# Patient Record
Sex: FEMALE | Employment: UNEMPLOYED | URBAN - METROPOLITAN AREA
[De-identification: names, ages, dates, MRNs, and addresses within clinical notes are randomized per-mention and may not be internally consistent; named-entity substitution may affect disease eponyms.]

---

## 2018-07-11 ENCOUNTER — OFFICE VISIT (OUTPATIENT)
Dept: SURGERY | Age: 58
End: 2018-07-11

## 2018-07-11 VITALS
DIASTOLIC BLOOD PRESSURE: 54 MMHG | HEIGHT: 68 IN | HEART RATE: 95 BPM | WEIGHT: 227 LBS | BODY MASS INDEX: 34.4 KG/M2 | SYSTOLIC BLOOD PRESSURE: 123 MMHG

## 2018-07-11 DIAGNOSIS — N63.20 BREAST MASS, LEFT: Primary | ICD-10-CM

## 2018-07-11 RX ORDER — FLUCONAZOLE 150 MG/1
TABLET ORAL
COMMUNITY
Start: 2018-06-06 | End: 2018-07-11 | Stop reason: ALTCHOICE

## 2018-07-11 RX ORDER — ZOSTER VACCINE RECOMBINANT, ADJUVANTED 50 MCG/0.5
KIT INTRAMUSCULAR
COMMUNITY
Start: 2018-05-09

## 2018-07-11 RX ORDER — METRONIDAZOLE 500 MG/1
TABLET ORAL
COMMUNITY
Start: 2018-06-06 | End: 2018-07-11

## 2018-07-11 NOTE — LETTER
7/11/2018 5:13 PM 
 
Patient:  Ana Lipscomb YOB: 1960 Date of Visit: 7/11/2018 Dear Dr. Joe Cespedes: Thank you for referring Ms. Brenda Waddell to me for evaluation/treatment. Below are the relevant portions of my assessment and plan of care. HISTORY OF PRESENT ILLNESS Ana Lipscomb is a 63 y.o. female. HPI 
NEW patient referral for consultation by Dr. Ashok Walker FW for a palpable LEFT breast lump. The patient cannot palpate the lump but her gyn can palpate it. The lump has been there since last September and at that time had breast imaging that was benign. At her annual gyn checkup this year the lump is still palpable. The patient denies any nipple inversion or discharge. OB History Obstetric Comments  
  Menarche 16, LMP age 51, # of children 1, age of 4st delivery 29, Hysterectomy/oophorectomy no/no, Breast bx no history of breast feeding no BCP yes, Hormone therapy no  
  
  
No family history of breast or ovarian cancer. Bilateral diagnostic mammogram 9/2017 - BI RADS 1 Past Medical History:  
Diagnosis Date  Hypercholesterolemia  Hypertension Past Surgical History:  
Procedure Laterality Date  HX GYN    
 3 c/sections  LAP, PLACE ADJUST GASTR BAND Social History Social History  Marital status:  Spouse name: N/A  
 Number of children: N/A  
 Years of education: N/A Occupational History  Not on file. Social History Main Topics  Smoking status: Never Smoker  Smokeless tobacco: Never Used  Alcohol use No  
 Drug use: No  
 Sexual activity: Not on file Other Topics Concern  Not on file Social History Narrative Current Outpatient Prescriptions on File Prior to Visit Medication Sig Dispense Refill  losartan (COZAAR) 100 mg tablet Take 100 mg by mouth daily. No current facility-administered medications on file prior to visit. Allergies Allergen Reactions  Sudafed [Pseudoephedrine Hcl] Unknown (comments) OB History Obstetric Comments Menarche 16, LMP age 51, # of children 1, age of 4st delivery 29, Hysterectomy/oophorectomy no/no, Breast bx no history of breast feeding no BCP yes, Hormone therapy no ROS Constitutional: Negative. HENT: Negative. Eyes: Negative. Respiratory: Negative. Cardiovascular: Negative. Gastrointestinal: Negative. Genitourinary: Negative. Musculoskeletal: Negative. Skin: Negative. Neurological: Negative. Endo/Heme/Allergies: Negative. Psychiatric/Behavioral: Negative. Physical Exam  
Cardiovascular: Normal rate, regular rhythm and normal heart sounds. Pulmonary/Chest: Breath sounds normal. Right breast exhibits no inverted nipple, no mass, no nipple discharge, no skin change and no tenderness. Left breast exhibits mass. Left breast exhibits no inverted nipple, no nipple discharge, no skin change and no tenderness. Breasts are asymmetrical (L>R). Lymphadenopathy:  
     Right cervical: No superficial cervical, no deep cervical and no posterior cervical adenopathy present. Left cervical: No superficial cervical, no deep cervical and no posterior cervical adenopathy present. She has no axillary adenopathy. Right axillary: No pectoral and no lateral adenopathy present. Left axillary: No pectoral and no lateral adenopathy present. BREAST ULTRASOUND Indication: Left  breast mass UOQ Technique: The area was scanned using a high-frequency linear-array near-field transducer Findings: Grouping of prominent, normal lobules Impression: Normal breast tissue Disposition: No worrisome finding on ultrasound ASSESSMENT and PLAN 
  ICD-10-CM ICD-9-CM 1. Breast mass, left N63.20 611.72 New pt presents for evaluation of tender LEFT breast mass.  She states that her OB-GYN felt mass last November, mammo was negative, and pt has not had MRI due to insurance. Pt also notes asymmetry, L>R. Mass palpable on exam at LEFT breast UOQ. US visualizes grouping of prominent, normal lobules. I wouldn't classify this as a lipoma, and do not believe it to be worrisome. Also discussed asymmetry. Pt notes that she lost weight several years ago with lap band, but has gained some of this weight back. Discussed plastic surgery options including mastopexy. Assured pt that asymmetry itself will not cause any long term harm or increased risk to her health. If this continues to be bothersome to pt (soreness due to trouble finding a supportive bra, trouble fitting in her clothes), I can refer her to a plastic surgeon for further consultation. F/U PRN. This plan was reviewed with the patient and patient agrees. All questions were answered. Written by Derek Payne, as dictated by Dr. Keyon Galeas MD. 
 
 
If you have questions, please do not hesitate to call me. I look forward to following MsGeovany Dahlia Mendez along with you.  
 
 
 
Sincerely, 
 
 
Feroz Koch MD

## 2018-07-11 NOTE — COMMUNICATION BODY
HISTORY OF PRESENT ILLNESS  Tim Saldivar is a 63 y.o. female. HPI  NEW patient referral for consultation by Dr. Kevon Hoffman FW for a palpable LEFT breast lump. The patient cannot palpate the lump but her gyn can palpate it. The lump has been there since last September and at that time had breast imaging that was benign. At her annual gyn checkup this year the lump is still palpable. The patient denies any nipple inversion or discharge. OB History     Obstetric Comments     Menarche 16, LMP age 51, # of children 1, age of 4st delivery 29, Hysterectomy/oophorectomy no/no, Breast bx no history of breast feeding no BCP yes, Hormone therapy no         No family history of breast or ovarian cancer. Bilateral diagnostic mammogram 9/2017 - BI RADS 1    Past Medical History:   Diagnosis Date    Hypercholesterolemia     Hypertension        Past Surgical History:   Procedure Laterality Date    HX GYN      3 c/sections    LAP, PLACE ADJUST GASTR BAND         Social History     Social History    Marital status:      Spouse name: N/A    Number of children: N/A    Years of education: N/A     Occupational History    Not on file. Social History Main Topics    Smoking status: Never Smoker    Smokeless tobacco: Never Used    Alcohol use No    Drug use: No    Sexual activity: Not on file     Other Topics Concern    Not on file     Social History Narrative       Current Outpatient Prescriptions on File Prior to Visit   Medication Sig Dispense Refill    losartan (COZAAR) 100 mg tablet Take 100 mg by mouth daily. No current facility-administered medications on file prior to visit.         Allergies   Allergen Reactions    Sudafed [Pseudoephedrine Hcl] Unknown (comments)       OB History     Obstetric Comments    Menarche 16, LMP age 51, # of children 1, age of 4st delivery 29, Hysterectomy/oophorectomy no/no, Breast bx no history of breast feeding no BCP yes, Hormone therapy no ROS  Constitutional: Negative. HENT: Negative. Eyes: Negative. Respiratory: Negative. Cardiovascular: Negative. Gastrointestinal: Negative. Genitourinary: Negative. Musculoskeletal: Negative. Skin: Negative. Neurological: Negative. Endo/Heme/Allergies: Negative. Psychiatric/Behavioral: Negative. Physical Exam   Cardiovascular: Normal rate, regular rhythm and normal heart sounds. Pulmonary/Chest: Breath sounds normal. Right breast exhibits no inverted nipple, no mass, no nipple discharge, no skin change and no tenderness. Left breast exhibits mass. Left breast exhibits no inverted nipple, no nipple discharge, no skin change and no tenderness. Breasts are asymmetrical (L>R). Lymphadenopathy:        Right cervical: No superficial cervical, no deep cervical and no posterior cervical adenopathy present. Left cervical: No superficial cervical, no deep cervical and no posterior cervical adenopathy present. She has no axillary adenopathy. Right axillary: No pectoral and no lateral adenopathy present. Left axillary: No pectoral and no lateral adenopathy present. BREAST ULTRASOUND  Indication: Left  breast mass UOQ  Technique: The area was scanned using a high-frequency linear-array near-field transducer  Findings: Grouping of prominent, normal lobules  Impression: Normal breast tissue   Disposition: No worrisome finding on ultrasound    ASSESSMENT and PLAN    ICD-10-CM ICD-9-CM    1. Breast mass, left N63.20 611.72       New pt presents for evaluation of tender LEFT breast mass. She states that her OB-GYN felt mass last November, mammo was negative, and pt has not had MRI due to insurance. Pt also notes asymmetry, L>R. Mass palpable on exam at LEFT breast UOQ. US visualizes grouping of prominent, normal lobules. I wouldn't classify this as a lipoma, and do not believe it to be worrisome. Also discussed asymmetry.  Pt notes that she lost weight several years ago with lap band, but has gained some of this weight back. Discussed plastic surgery options including mastopexy. Assured pt that asymmetry itself will not cause any long term harm or increased risk to her health. If this continues to be bothersome to pt (soreness due to trouble finding a supportive bra, trouble fitting in her clothes), I can refer her to a plastic surgeon for further consultation. F/U PRN. This plan was reviewed with the patient and patient agrees. All questions were answered.     Written by Fernandez Aquino, as dictated by Dr. Jovanna Lopez MD.

## 2018-07-11 NOTE — PATIENT INSTRUCTIONS
Breast Lumps: Care Instructions  Your Care Instructions  Breast lumps are common, especially in women between ages 27 and 48. Many women's breasts feel lumpy and tender before their menstrual period. Women also may have lumps when they are breastfeeding. Breast lumps may go away after menopause. All new breast lumps in women after menopause should be checked by a doctor. Although lumps may be normal for you, it is important to have your doctor check any lump or thickness that is not like the rest of your breast to make sure it is not cancer. A lump may be larger, harder, or different from the rest of your breast tissue. Follow-up care is a key part of your treatment and safety. Be sure to make and go to all appointments, and call your doctor if you are having problems. It's also a good idea to know your test results and keep a list of the medicines you take. How can you care for yourself at home? · Make an appointment to have a mammogram and other follow-up visits as recommended by your doctor. When should you call for help? Watch closely for changes in your health, and be sure to contact your doctor if:    · You do not get better as expected.     · Your breast has changed.     · You have pain in your breast.     · You have a discharge from your nipple.     · A breast lump changes or does not go away. Where can you learn more? Go to http://myra-charmaine.info/. Enter B729 in the search box to learn more about \"Breast Lumps: Care Instructions. \"  Current as of: October 6, 2017  Content Version: 11.7  © 7525-6202 [employer]. Care instructions adapted under license by [employer] (which disclaims liability or warranty for this information). If you have questions about a medical condition or this instruction, always ask your healthcare professional. [address] any warranty or liability for your use of this information.

## 2018-07-11 NOTE — PROGRESS NOTES
HISTORY OF PRESENT ILLNESS  Floyd Poe is a 63 y.o. female. HPI NEW patient referral for consultation by Dr. Carrington Pérez [facility] for a palpable LEFT breast lump. The patient cannot palpate the lump but her gyn can palpate it. The lump has been there since last September and at that time had breast imaging that was benign. At her annual gyn checkup this year the lump is still palpable. The patient denies any nipple inversion or discharge. OB History     Obstetric Comments    Menarche 16, LMP age 51, # of children 1, age of 4st delivery 29, Hysterectomy/oophorectomy no/no, Breast bx no history of breast feeding no BCP yes, Hormone therapy no        No family history of breast or ovarian cancer. Bilateral diagnostic mammogram 9/2017 - BI RADS 1    Review of Systems   Constitutional: Negative. HENT: Negative. Eyes: Negative. Respiratory: Negative. Cardiovascular: Negative. Gastrointestinal: Negative. Genitourinary: Negative. Musculoskeletal: Negative. Skin: Negative. Neurological: Negative. Endo/Heme/Allergies: Negative. Psychiatric/Behavioral: Negative.         Physical Exam    ASSESSMENT and PLAN  {ASSESSMENT/PLAN:57251}

## 2018-07-11 NOTE — PROGRESS NOTES
HISTORY OF PRESENT ILLNESS  Charmaine Pack is a 63 y.o. female. HPI  NEW patient referral for consultation by Dr. Delmy PATIÑO for a palpable LEFT breast lump. The patient cannot palpate the lump but her gyn can palpate it. The lump has been there since last September and at that time had breast imaging that was benign. At her annual gyn checkup this year the lump is still palpable. The patient denies any nipple inversion or discharge. OB History     Obstetric Comments     Menarche 16, LMP age 51, # of children 1, age of 4st delivery 29, Hysterectomy/oophorectomy no/no, Breast bx no history of breast feeding no BCP yes, Hormone therapy no         No family history of breast or ovarian cancer. Bilateral diagnostic mammogram 9/2017 - BI RADS 1    Past Medical History:   Diagnosis Date    Hypercholesterolemia     Hypertension        Past Surgical History:   Procedure Laterality Date    HX GYN      3 c/sections    LAP, PLACE ADJUST GASTR BAND         Social History     Social History    Marital status:      Spouse name: N/A    Number of children: N/A    Years of education: N/A     Occupational History    Not on file. Social History Main Topics    Smoking status: Never Smoker    Smokeless tobacco: Never Used    Alcohol use No    Drug use: No    Sexual activity: Not on file     Other Topics Concern    Not on file     Social History Narrative       Current Outpatient Prescriptions on File Prior to Visit   Medication Sig Dispense Refill    losartan (COZAAR) 100 mg tablet Take 100 mg by mouth daily. No current facility-administered medications on file prior to visit.         Allergies   Allergen Reactions    Sudafed [Pseudoephedrine Hcl] Unknown (comments)       OB History     Obstetric Comments    Menarche 16, LMP age 51, # of children 1, age of 4st delivery 29, Hysterectomy/oophorectomy no/no, Breast bx no history of breast feeding no BCP yes, Hormone therapy no ROS  Constitutional: Negative. HENT: Negative. Eyes: Negative. Respiratory: Negative. Cardiovascular: Negative. Gastrointestinal: Negative. Genitourinary: Negative. Musculoskeletal: Negative. Skin: Negative. Neurological: Negative. Endo/Heme/Allergies: Negative. Psychiatric/Behavioral: Negative. Physical Exam   Cardiovascular: Normal rate, regular rhythm and normal heart sounds. Pulmonary/Chest: Breath sounds normal. Right breast exhibits no inverted nipple, no mass, no nipple discharge, no skin change and no tenderness. Left breast exhibits mass. Left breast exhibits no inverted nipple, no nipple discharge, no skin change and no tenderness. Breasts are asymmetrical (L>R). Lymphadenopathy:        Right cervical: No superficial cervical, no deep cervical and no posterior cervical adenopathy present. Left cervical: No superficial cervical, no deep cervical and no posterior cervical adenopathy present. She has no axillary adenopathy. Right axillary: No pectoral and no lateral adenopathy present. Left axillary: No pectoral and no lateral adenopathy present. BREAST ULTRASOUND  Indication: Left  breast mass UOQ  Technique: The area was scanned using a high-frequency linear-array near-field transducer  Findings: Grouping of prominent, normal lobules  Impression: Normal breast tissue   Disposition: No worrisome finding on ultrasound    ASSESSMENT and PLAN    ICD-10-CM ICD-9-CM    1. Breast mass, left N63.20 611.72       New pt presents for evaluation of tender LEFT breast mass. She states that her OB-GYN felt mass last November, mammo was negative, and pt has not had MRI due to insurance. Pt also notes asymmetry, L>R. Mass palpable on exam at LEFT breast UOQ. US visualizes grouping of prominent, normal lobules. I wouldn't classify this as a lipoma, and do not believe it to be worrisome. Also discussed asymmetry.  Pt notes that she lost weight several years ago with lap band, but has gained some of this weight back. Discussed plastic surgery options including mastopexy. Assured pt that asymmetry itself will not cause any long term harm or increased risk to her health. If this continues to be bothersome to pt (soreness due to trouble finding a supportive bra, trouble fitting in her clothes), I can refer her to a plastic surgeon for further consultation. F/U PRN. This plan was reviewed with the patient and patient agrees. All questions were answered.     Written by Fannie Doan, as dictated by Dr. Briana Blevins MD.

## 2018-07-18 DIAGNOSIS — N63.20 BREAST MASS, LEFT: Primary | ICD-10-CM

## 2020-05-02 ENCOUNTER — HOSPITAL ENCOUNTER (EMERGENCY)
Age: 60
Discharge: LEFT BEFORE DISCHARGE | End: 2020-05-02
Admitting: EMERGENCY MEDICINE
Payer: COMMERCIAL

## 2020-05-02 VITALS
DIASTOLIC BLOOD PRESSURE: 68 MMHG | SYSTOLIC BLOOD PRESSURE: 122 MMHG | HEART RATE: 71 BPM | TEMPERATURE: 98.1 F | OXYGEN SATURATION: 98 % | RESPIRATION RATE: 16 BRPM

## 2020-05-02 PROCEDURE — 75810000275 HC EMERGENCY DEPT VISIT NO LEVEL OF CARE

## 2020-05-02 NOTE — ED TRIAGE NOTES
Arrived from home, reporting abdominal pain, that started 4 hours ago. Patient reports that the cramping comes in waves. Also reports increased belching. Pt reports normal BM's, no urinary symptoms. Denies constipation, diarrhea. \"something in my small intestines aren't working. \" Patient self medicated suppository mag citrate.

## 2020-05-04 ENCOUNTER — PATIENT OUTREACH (OUTPATIENT)
Dept: FAMILY MEDICINE CLINIC | Age: 60
End: 2020-05-04

## 2020-05-04 NOTE — PROGRESS NOTES
Patient contacted regarding recent discharge and COVID-19 risk   Care Transition Nurse/ Ambulatory Care Manager contacted the patient by telephone to perform post discharge assessment. Verified name and  with patient as identifiers. Patient has following risk factors of: mobid obesity. CTN/ACM reviewed discharge instructions, medical action plan and red flags related to discharge diagnosis. Reviewed and educated them on any new and changed medications related to discharge diagnosis. Advised obtaining a 90-day supply of all daily and as-needed medications. Education provided regarding infection prevention, and signs and symptoms of COVID-19 and when to seek medical attention with patient who verbalized understanding. Discussed exposure protocols and quarantine from [address] you at higher risk for severe illness  and given an opportunity for questions and concerns. The patient agrees to contact the COVID-19 hotline [phone] or PCP office for questions related to their healthcare. CTN/ACM provided contact information for future reference. From CDC: Are you at higher risk for severe illness?  Wash your hands often.  Avoid close contact (6 feet, which is about two arm lengths) with people who are sick.  Put distance between yourself and other people if COVID-19 is spreading in your community.  Clean and disinfect frequently touched surfaces.  Avoid all cruise travel and non-essential air travel.  Call your healthcare professional if you have concerns about COVID-19 and your underlying condition or if you are sick.     For more information on steps you can take to protect yourself, see CDC's How to Protect Yourself      Patient/family/caregiver given information for [facility] [place] and agrees to enroll yes  Patient's preferred e-mail: [email or web address]  Patient's preferred phone [phone]   Based on Loop alert triggers, patient will be contacted by nurse care manager for worsening symptoms. Pt will be further monitored by COVID Loop Team based on severity of symptoms and risk factors. Scheduled to follow up with GI in 2 weeks. Declined new appointment.

## 2022-06-27 NOTE — ED NOTES
H&P reviewed  After examining the patient I find no changes in the patients condition since the H&P had been written  ROS: Marisa is only complaining of mild discomfort in her mouth, denies f/c, N/v, headache  ABD: soft, nt, nd, normal bowel sounds present  Ext: she is able to move all extremities, SILT, <2sec cap refill  Our plan today is to extract teeth 2-6, 13, 15, 20-22, 28-32 under general anesthesia in the Main OR    Gilson De Jesus DMD      Vitals:    06/27/22 1214   BP: 131/99   Pulse: 76   Resp: 20   Temp: 98 1 °F (36 7 °C)   SpO2: 100% Patient stated to registration that she felt \"so much better. \"    Charge RN and MD aware.